# Patient Record
Sex: FEMALE | Race: WHITE
[De-identification: names, ages, dates, MRNs, and addresses within clinical notes are randomized per-mention and may not be internally consistent; named-entity substitution may affect disease eponyms.]

---

## 2022-08-30 ENCOUNTER — HOSPITAL ENCOUNTER (EMERGENCY)
Dept: HOSPITAL 54 - ER | Age: 72
Discharge: HOME | End: 2022-08-30
Payer: COMMERCIAL

## 2022-08-30 VITALS — WEIGHT: 160 LBS | BODY MASS INDEX: 28.35 KG/M2 | HEIGHT: 63 IN

## 2022-08-30 VITALS — SYSTOLIC BLOOD PRESSURE: 145 MMHG | DIASTOLIC BLOOD PRESSURE: 88 MMHG
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
## 2022-08-30 DIAGNOSIS — S09.90XA: ICD-10-CM

## 2022-08-30 DIAGNOSIS — I10: ICD-10-CM

## 2022-08-30 DIAGNOSIS — S13.4XXA: Primary | ICD-10-CM

## 2022-08-30 DIAGNOSIS — Z79.899: ICD-10-CM

## 2022-08-30 DIAGNOSIS — R07.89: ICD-10-CM

## 2022-08-30 DIAGNOSIS — Z88.5: ICD-10-CM
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
## 2022-08-30 PROCEDURE — 72125 CT NECK SPINE W/O DYE: CPT

## 2022-08-30 PROCEDURE — 70450 CT HEAD/BRAIN W/O DYE: CPT

## 2022-08-30 PROCEDURE — 99284 EMERGENCY DEPT VISIT MOD MDM: CPT

## 2022-08-30 PROCEDURE — 71045 X-RAY EXAM CHEST 1 VIEW: CPT
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
